# Patient Record
Sex: FEMALE | Race: WHITE | URBAN - METROPOLITAN AREA
[De-identification: names, ages, dates, MRNs, and addresses within clinical notes are randomized per-mention and may not be internally consistent; named-entity substitution may affect disease eponyms.]

---

## 2024-01-31 ENCOUNTER — OFFICE VISIT (OUTPATIENT)
Dept: FAMILY MEDICINE CLINIC | Facility: CLINIC | Age: 29
End: 2024-01-31
Payer: COMMERCIAL

## 2024-01-31 VITALS
TEMPERATURE: 97 F | OXYGEN SATURATION: 96 % | BODY MASS INDEX: 27.47 KG/M2 | HEIGHT: 67 IN | SYSTOLIC BLOOD PRESSURE: 122 MMHG | RESPIRATION RATE: 16 BRPM | DIASTOLIC BLOOD PRESSURE: 88 MMHG | WEIGHT: 175 LBS | HEART RATE: 99 BPM

## 2024-01-31 DIAGNOSIS — Z32.01 PREGNANCY TEST POSITIVE: ICD-10-CM

## 2024-01-31 DIAGNOSIS — E83.119 HEMOCHROMATOSIS, UNSPECIFIED HEMOCHROMATOSIS TYPE: Primary | ICD-10-CM

## 2024-01-31 LAB
B-HCG SERPL-ACNC: <1 MIU/ML
BASOPHILS # BLD AUTO: 0.06 X10(3) UL (ref 0–0.2)
BASOPHILS NFR BLD AUTO: 0.8 %
DEPRECATED HBV CORE AB SER IA-ACNC: 72.7 NG/ML
EOSINOPHIL # BLD AUTO: 0.22 X10(3) UL (ref 0–0.7)
EOSINOPHIL NFR BLD AUTO: 3 %
ERYTHROCYTE [DISTWIDTH] IN BLOOD BY AUTOMATED COUNT: 11.4 %
HCT VFR BLD AUTO: 42.2 %
HGB BLD-MCNC: 14.6 G/DL
IMM GRANULOCYTES # BLD AUTO: 0.03 X10(3) UL (ref 0–1)
IMM GRANULOCYTES NFR BLD: 0.4 %
IRON SATN MFR SERPL: 16 %
IRON SERPL-MCNC: 50 UG/DL
LYMPHOCYTES # BLD AUTO: 2.08 X10(3) UL (ref 1–4)
LYMPHOCYTES NFR BLD AUTO: 28.4 %
MCH RBC QN AUTO: 30.6 PG (ref 26–34)
MCHC RBC AUTO-ENTMCNC: 34.6 G/DL (ref 31–37)
MCV RBC AUTO: 88.5 FL
MONOCYTES # BLD AUTO: 0.64 X10(3) UL (ref 0.1–1)
MONOCYTES NFR BLD AUTO: 8.7 %
NEUTROPHILS # BLD AUTO: 4.29 X10 (3) UL (ref 1.5–7.7)
NEUTROPHILS # BLD AUTO: 4.29 X10(3) UL (ref 1.5–7.7)
NEUTROPHILS NFR BLD AUTO: 58.7 %
PLATELET # BLD AUTO: 238 10(3)UL (ref 150–450)
RBC # BLD AUTO: 4.77 X10(6)UL
TIBC SERPL-MCNC: 319 UG/DL (ref 240–450)
TRANSFERRIN SERPL-MCNC: 214 MG/DL (ref 200–360)
WBC # BLD AUTO: 7.3 X10(3) UL (ref 4–11)

## 2024-01-31 PROCEDURE — 84702 CHORIONIC GONADOTROPIN TEST: CPT | Performed by: NURSE PRACTITIONER

## 2024-01-31 PROCEDURE — 3008F BODY MASS INDEX DOCD: CPT | Performed by: NURSE PRACTITIONER

## 2024-01-31 PROCEDURE — 85025 COMPLETE CBC W/AUTO DIFF WBC: CPT | Performed by: NURSE PRACTITIONER

## 2024-01-31 PROCEDURE — 82728 ASSAY OF FERRITIN: CPT | Performed by: NURSE PRACTITIONER

## 2024-01-31 PROCEDURE — 3079F DIAST BP 80-89 MM HG: CPT | Performed by: NURSE PRACTITIONER

## 2024-01-31 PROCEDURE — 83540 ASSAY OF IRON: CPT | Performed by: NURSE PRACTITIONER

## 2024-01-31 PROCEDURE — 3074F SYST BP LT 130 MM HG: CPT | Performed by: NURSE PRACTITIONER

## 2024-01-31 PROCEDURE — 83550 IRON BINDING TEST: CPT | Performed by: NURSE PRACTITIONER

## 2024-01-31 PROCEDURE — 99203 OFFICE O/P NEW LOW 30 MIN: CPT | Performed by: NURSE PRACTITIONER

## 2024-01-31 NOTE — PROGRESS NOTES
CHIEF COMPLAINT:    Chief Complaint   Patient presents with    Establish Care     New patient       HISTORY OF PRESENT ILLNESS:    Hanna presents today, January 31, 2024, to establish care.  Reports hx of hemochromatosis, confirmed by genetic testing twice per patient.  Has underwent tonsillectomy at the age of 8-9 years old and repair of labrum of right hip d/t labral tear.  Denies past complications with anesthesia.    Hanna would like testing to monitor hemochromatosis and possible pregnancy.  Reports positive at home pregnancy test.  Concerned as she is spotting today.  Menstrual periods have been irregular after discontinuing birth control.  Pregnancy is desired.  Currently taking prenatal capsule and multivitamin gummie.  Denies history of blood clots.    Diastolic BP slightly elevated, has been following a regular diet moderately high in sodium.  Plans to increase physical activity and improve diet.   is a .    ALLERGIES:  Allergies   Allergen Reactions    Penicillins NAUSEA ONLY       CURRENT MEDICATIONS:  No current outpatient medications on file.       MEDICAL HISTORY:  History reviewed. No pertinent past medical history.  Past Surgical History:   Procedure Laterality Date    OTHER SURGICAL HISTORY  2019    Repair of labral tear, right hip    TONSILLECTOMY  2006    denies complciations with anesthesia     Family History   Problem Relation Age of Onset    Breast Cancer Mother     No Known Problems Father     No Known Problems Sister     No Known Problems Brother     Breast Cancer Maternal Grandmother      Family Status   Relation Status    Mo (Not Specified)    Fa (Not Specified)    Sis (Not Specified)    Bro (Not Specified)    MGMA (Not Specified)     Social History     Socioeconomic History    Marital status:    Tobacco Use    Smoking status: Never    Smokeless tobacco: Never   Vaping Use    Vaping Use: Every day    Substances: Nicotine   Substance and Sexual Activity     Alcohol use: Yes     Comment: social       ROS:  GENERAL:  Denies recorded temperatures greater than 100.5F  RESPIRATORY:  Denies difficulty breathing  CARDIAC:  Denies swelling of BLE    VITALS:   /88 (BP Location: Left arm, Patient Position: Sitting)   Pulse 99   Temp 97.3 °F (36.3 °C) (Temporal)   Resp 16   Ht 5' 7\" (1.702 m)   Wt 175 lb (79.4 kg)   LMP 12/25/2023   SpO2 96%   BMI 27.41 kg/m²     Reviewed by Jessica Guan MS, APRN, FNP-BC    PHYSICAL EXAM:    Constitutional:       Appears well.  Sitting upright on exam table.  Well developed, well nourished, and in no acute distress  HEENT:      Facial features symmetric. Normocephalic and atraumatic     Sclera anicteric.  EOMs intact without nystagmus.  Pupils round and equal.  Cardiovascular:      Heart sounds: Regular rate and rhythm without murmur  Pulmonary:      Chest expansion symmetric.  Breathing nonlabored. Lungs clear throughout  Musculoskeletal:         Movements smooth and controlled with appropriate coordination.       Gait intact, steady, nonantalgic.  Skin:     Warm and dry without discoloration.  Psychiatric:         Alert and oriented.  Calm and cooperative.  Speech is clear.     ASSESSMENT & PLAN:    1. Hemochromatosis, unspecified hemochromatosis type  - CBC W Differential W Platelet [E]; Future  - VENIPUNCTURE  - Ferritin; Future  - Iron And Tibc; Future  - CBC W Differential W Platelet [E]  - Ferritin  - Iron And Tibc    2. Pregnancy test positive  - HCG, Beta Subunit (Quant Pregnancy Test) [E]; Future  - HCG, Beta Subunit (Quant Pregnancy Test) [E]    Follow-up yearly with [facility]  Follow-up with OB/GYN to establish care